# Patient Record
Sex: FEMALE
[De-identification: names, ages, dates, MRNs, and addresses within clinical notes are randomized per-mention and may not be internally consistent; named-entity substitution may affect disease eponyms.]

---

## 2019-01-22 ENCOUNTER — HOSPITAL ENCOUNTER (OUTPATIENT)
Dept: HOSPITAL 14 - H.OPSURG | Age: 54
Discharge: HOME | End: 2019-01-22
Attending: OBSTETRICS & GYNECOLOGY
Payer: SELF-PAY

## 2019-01-22 VITALS
OXYGEN SATURATION: 98 % | TEMPERATURE: 97.6 F | DIASTOLIC BLOOD PRESSURE: 70 MMHG | SYSTOLIC BLOOD PRESSURE: 120 MMHG | HEART RATE: 72 BPM

## 2019-01-22 VITALS — RESPIRATION RATE: 18 BRPM

## 2019-01-22 VITALS — BODY MASS INDEX: 18.6 KG/M2

## 2019-01-22 DIAGNOSIS — D06.7: Primary | ICD-10-CM

## 2019-01-22 DIAGNOSIS — N72: ICD-10-CM

## 2019-01-22 PROCEDURE — 57522 CONIZATION OF CERVIX: CPT

## 2019-01-22 PROCEDURE — 88305 TISSUE EXAM BY PATHOLOGIST: CPT

## 2019-01-28 NOTE — OP
PROCEDURE DATE:  01/22/2019



PREOPERATIVE DIAGNOSIS:  High grade cervical dysplasia.



POSTOPERATIVE DIAGNOSIS:  High grade cervical dysplasia.



PROCEDURE:  Loop electrosurgical excision procedure.



SURGEON:  Karla Murphy MD.



ASSISTANT:  Charo Pride PGY2.



ESTIMATED BLOOD LOSS;  5 mL.



INTRAVENOUS FLUIDS:  950 mL lactated Ringer's.



COMPLICATIONS:  None.



PATHOLOGY:  Loop cone biopsy.



ANESTHESIA:  General.



FINDINGS:  Normal-sized uterus with no adnexal masses or gross lesions

noted.  Areas of non-uptake seen surrounding the entire transformation

zone when Lugols solution applied. 



DESCRIPTION OF PROCEDURE:  The patient was taken to the operating room and

placed on general anesthesia without difficulty.  She was then placed in

the dorsal lithotomy position and prepped and draped in the usual sterile

fashion.  An insulated bivalve speculum was then inserted into the vagina

to expose the cervix.  This was fitted with an evacuation system to remove

any resultant smoke from the procedure.  Lugol solution was then applied to

the cervix and the area of non-uptake was seen surrounding the transformation

zone.  Prior to this, her bladder was then drained of approximately 20 mL

of clear yellow urine.  The loop electrode was then used to excise the

abnormal tissue at the transformation zone moving in an anterior to

posterior fashion and this was then removed and handed off the field for

pathology.  The bed of the excised tissue was then fulgurated using the

ball electrode and the periphery of the extracted tissue was then cauterized

as well.  Minimal bleeding was noted.  Monsel solution was then also

applied and again good hemostasis was noted.  The speculum and all

instruments were then withdrawn.  The patient was then cleaned,  repositioned 
and

awakened from anesthesia.  

She would be discharged home the same day. She was informed to use Motrin for 
any pain.

Continue pelvic rest and return to the office in 2 weeks for pathology

review and postoperative evaluation.







__________________________________________

Karla Murphy MD





DD:  01/28/2019 7:21:20

DT:  01/28/2019 13:05:33

Job # [id number]

[email or web address]